# Patient Record
Sex: FEMALE | URBAN - METROPOLITAN AREA
[De-identification: names, ages, dates, MRNs, and addresses within clinical notes are randomized per-mention and may not be internally consistent; named-entity substitution may affect disease eponyms.]

---

## 2021-06-01 ENCOUNTER — OFFICE VISIT (OUTPATIENT)
Dept: URBAN - METROPOLITAN AREA CLINIC 40 | Facility: CLINIC | Age: 71
End: 2021-06-01
Payer: MEDICARE

## 2021-06-01 DIAGNOSIS — H53.19 OTHER SUBJECTIVE VISUAL DISTURBANCES: Primary | ICD-10-CM

## 2021-06-01 DIAGNOSIS — H04.123 DRY EYE SYNDROME OF BILATERAL LACRIMAL GLANDS: ICD-10-CM

## 2021-06-01 PROCEDURE — 99204 OFFICE O/P NEW MOD 45 MIN: CPT | Performed by: OPTOMETRIST

## 2021-06-01 ASSESSMENT — INTRAOCULAR PRESSURE
OS: 13
OD: 13

## 2021-06-01 ASSESSMENT — KERATOMETRY
OD: 44.88
OS: 45.38

## 2021-06-01 NOTE — IMPRESSION/PLAN
Impression: Other subjective visual disturbances: H53.19. Plan: Discussed diagnosis in detail with patient. Will continue to observe condition and or symptoms. Vision loss due to stroke. Encouraged patient to have VF done but she declined. Monitor.

## 2022-06-01 ENCOUNTER — OFFICE VISIT (OUTPATIENT)
Dept: URBAN - METROPOLITAN AREA CLINIC 40 | Facility: CLINIC | Age: 72
End: 2022-06-01
Payer: MEDICARE

## 2022-06-01 DIAGNOSIS — H54.8 BLINDNESS NOS ACCORDING TO USA DEFINITION: ICD-10-CM

## 2022-06-01 DIAGNOSIS — H04.123 DRY EYE SYNDROME OF BILATERAL LACRIMAL GLANDS: Primary | ICD-10-CM

## 2022-06-01 PROCEDURE — 99214 OFFICE O/P EST MOD 30 MIN: CPT | Performed by: OPTOMETRIST

## 2022-06-01 ASSESSMENT — KERATOMETRY
OD: 44.75
OS: 45.38

## 2022-06-01 ASSESSMENT — INTRAOCULAR PRESSURE
OD: 12
OS: 12

## 2022-06-01 NOTE — IMPRESSION/PLAN
Impression: Blindness NOS according to [place] definition: H54.8. Plan: Monitor. Legal blindness due to stroke in 2016.

## 2023-06-02 ENCOUNTER — OFFICE VISIT (OUTPATIENT)
Dept: URBAN - METROPOLITAN AREA CLINIC 40 | Facility: CLINIC | Age: 73
End: 2023-06-02
Payer: MEDICARE

## 2023-06-02 DIAGNOSIS — H54.8 BLINDNESS NOS ACCORDING TO USA DEFINITION: ICD-10-CM

## 2023-06-02 DIAGNOSIS — H43.811 VITREOUS DEGENERATION, RIGHT EYE: Primary | ICD-10-CM

## 2023-06-02 PROCEDURE — 99214 OFFICE O/P EST MOD 30 MIN: CPT | Performed by: OPTOMETRIST

## 2023-06-02 ASSESSMENT — INTRAOCULAR PRESSURE
OD: 12
OS: 12

## 2023-06-02 ASSESSMENT — KERATOMETRY
OD: 44.88
OS: 45.75

## 2023-06-02 NOTE — IMPRESSION/PLAN
Impression: Vitreous degeneration, right eye: H43.811. Plan: Posterior vitreous detachment accounts for the patient's complaints. There is no evidence of retinal pathology. All signs, symptoms and risks of retinal detachment and tears were discussed in detail. Patient instructed to call the office immediately if any symptoms noted. Recommend the patient return to office for follow up.  If not better see MD.